# Patient Record
Sex: MALE | URBAN - METROPOLITAN AREA
[De-identification: names, ages, dates, MRNs, and addresses within clinical notes are randomized per-mention and may not be internally consistent; named-entity substitution may affect disease eponyms.]

---

## 2024-03-26 ENCOUNTER — APPOINTMENT (RX ONLY)
Dept: URBAN - METROPOLITAN AREA CLINIC 129 | Facility: CLINIC | Age: 34
Setting detail: DERMATOLOGY
End: 2024-03-26

## 2024-03-26 DIAGNOSIS — A31.1 CUTANEOUS MYCOBACTERIAL INFECTION: ICD-10-CM

## 2024-03-26 DIAGNOSIS — D22 MELANOCYTIC NEVI: ICD-10-CM

## 2024-03-26 PROBLEM — D22.5 MELANOCYTIC NEVI OF TRUNK: Status: ACTIVE | Noted: 2024-03-26

## 2024-03-26 PROCEDURE — ? ADDITIONAL NOTES

## 2024-03-26 PROCEDURE — ? COUNSELING

## 2024-03-26 PROCEDURE — 99204 OFFICE O/P NEW MOD 45 MIN: CPT

## 2024-03-26 ASSESSMENT — LOCATION DETAILED DESCRIPTION DERM
LOCATION DETAILED: SUBMENTAL CHIN
LOCATION DETAILED: LEFT MEDIAL INFERIOR CHEST

## 2024-03-26 ASSESSMENT — LOCATION SIMPLE DESCRIPTION DERM
LOCATION SIMPLE: SUBMENTAL CHIN
LOCATION SIMPLE: CHEST

## 2024-03-26 ASSESSMENT — LOCATION ZONE DERM
LOCATION ZONE: FACE
LOCATION ZONE: TRUNK

## 2024-03-26 NOTE — PROCEDURE: ADDITIONAL NOTES
Render Risk Assessment In Note?: no
Additional Notes: Referral to plastics for consultation
Detail Level: Simple
Additional Notes: Dr. Aponte's comment: M. abscessus is considered a very difficult infection to eradicate.  It is often multidrug resistant.  With the degree of scarring seen on exam, a complete surgical clean out of the entire submental, inferior jawline and upper neck areas is recommended.  This work will dramatically increase the patient's chances of ultimately clearing this frustrating and devastating result from what was supposed to be a cosmetic improvement.   Initial inquiry into the plastics plan resulted in bad news.  Reportedly the proposed plan by his plastic surgeon was denied by his insurance.  We will continue helping the patient to find alternative sites and providers of care.

## 2024-03-26 NOTE — HPI: INFECTION (ABSCESS)
Is This A New Presentation, Or A Follow-Up?: Infection
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